# Patient Record
(demographics unavailable — no encounter records)

---

## 2025-03-03 NOTE — IMAGING
[Right] : right shoulder [Type 2 acromion] : Type 2 acromion [de-identified] :   ----------------------------------------------------------------------------   Thoracic/Lumbar spine exam:   Inspection:    (neg) Abnormal alignment (kyphosis/lordosis/scoliosis)   (neg) Atrophy ROM:    Pain:           (neg) Flexion/extension     (neg) Rotation    Stiffness:   (neg) Flexion/extension     (neg) Rotation                       (neg) Hamstring tightness Tenderness/Spasm:         +external oblique tenderness. no rib tenderness. no pain at CVA.    Lumbar paraspinal:          (+) Right    (neg) Left    (neg) Midline    Thoracic paraspinal:        (neg) Right    (neg) Left    (neg) Midline    PSIS:                                (neg) Right    (neg) Left    SI joint:                             (neg) Right    (neg) Left    Greater troch:                  (neg) Right    (neg) Left Strength: (out of 5)    Illiopsoas:           Right: 5   .    Left: 5    Quad:                 Right: 5   .    Left: 5    Hamstrings:        Right: 5   .    Left: 5    Anterior tibialis:  Right: 5    .   Left: 5    Gastrocsoleus:  Right: 5    .   Left: 5    EHL:                    Right: 5    .   Left: 5 Neuro: DTR's wnl.  Sensation to light touch grossly in tact in all distributions.    (neg) SLR    (neg) Femoral stretch Vascularity: Extremity warm and well perfused Gait: normal     ----------------------------------------------------------------------------   Right shoulder exam:   Skin: no significant pertinent finding Inspection: no obvious deformity, no obvious masses, no swelling, no effusion, no atrophy ROM:    FF: 180    ER: 70    IR: T12 Tenderness:    (neg) Anterior/Biceps:    (neg) Posterior    (+) Lateral    (neg) Trapezius    (neg) Scapula    (neg) AC joint    (neg) Crepitus with ROM Stability:    (neg) Translation    (neg) Apprehension    (neg) Clicking Additional tests:    (neg) Neer's    (neg) Hawkin's    (+) Husain's    (neg) Speed    (neg) Cross chest adduction Strength:    FF: 5/5    ER: 5/5    IR: 5/5    Biceps: 5/5    Triceps: 5/5    Distal: 5/5 Neuro: In tact to light touch throughout Vascularity: Extremity warm and well perfused

## 2025-03-03 NOTE — REASON FOR VISIT
[FreeTextEntry2] : Follow up right ribs states he is doing better attending PT 3 times a week and states his i having some sharp pain in rt shoulder area as well wants to get looked at.. Having  right shoulder pain with abduction for some months. Intermittent shooting pain

## 2025-03-03 NOTE — DISCUSSION/SUMMARY
[de-identified] : Discussed PT, Inj Physical Therapy rx Plan for right shoulder SAC / IAC f/u 6-8 weeks ----------------------------------------------------------------------------  Large joint corticosteroid injection given: Right shoulder subacromial  Patient indicated for injection after trial of rest, OTC medications including aspirin, Ibuprofen, Aleve etc or prescription NSAIDS, and/or exercises at home and/ or physical therapy without satisfactory response.  Patient has symptoms including pain, swelling, and/or decreased mobility in the joint. The risks, benefits, and alternatives to corticosteroid injection were explained in full to the patient, including but not limited to infection, sepsis, bleeding, scarring, skin discoloration, temporary increase in pain, syncopal episode, failure to resolve symptoms, allergic reaction, symptom recurrence, and elevation of blood sugar in diabetics. Patient understood the risks. All questions were answered. After discussion of options, patient requested an injection.   Oral informed consent was obtained and sterile technique was utilized for the procedure including the preparation of the solutions used for the injection and betadine followed by alcohol prep to the injection site. Anesthesia was given with ethyl chloride sprayed topically. The injection was delivered. Patient tolerated the procedure well.   Post Procedure Instructions: Patient was advised to call if redness, pain, or fever occur and apply ice for 15 min on and 15 min off later today  Medications delivered: Kenalog: 10 mg, Lidocaine: 4 cc, Marcaine: 4 cc.   ----------------------------------------------------------------------------   All relevant imaging studies pertinent to today's visit, including x-rays, MRI's and/or other advanced imaging studies (CT/etc) were independently interpreted and reviewed with the patient as needed. Implications of the studies together with the patient's clinical picture were discussed to formulate a working diagnosis and management options were detailed.   The patient and/or guardian was advised of the diagnosis.  The natural history of the pathology was explained in full. All questions were answered.  The risks and benefits of conservative and interventional treatment alternatives were explained to the patient   The patient and/or guardian was advised if any advanced diagnostic/imaging study (MRI/CT/etc) is ordered to evaluate potential pathology in the affected area(s), they should follow up in the office to review the results of the study and determine further management that may be indicated.

## 2025-03-03 NOTE — HISTORY OF PRESENT ILLNESS
[Sudden] : sudden [6] : 6 [3] : 3 [Frequent] : frequent [Rest] : rest [de-identified] : This is Mr. YUNG PONCE  a 44 year old male who comes in today complaining of right side pain after doing a hack squat at the gym on 1/14/25.  Pain in lateral oblique area   hx of GI ulcer [] : no [FreeTextEntry3] : 1/14/25 [FreeTextEntry7] : into back

## 2025-04-24 NOTE — HISTORY OF PRESENT ILLNESS
[Sudden] : sudden [5] : 5 [0] : 0 [Frequent] : frequent [Rest] : rest [de-identified] : This is Mr. YUNG PONCE  a 44 year old male who comes in today complaining of right side pain after doing a hack squat at the gym on 1/14/25.  Pain in lateral oblique area   hx of GI ulcer [] : no [FreeTextEntry3] : 1/14/25 [FreeTextEntry7] : into back [de-identified] : finished PT

## 2025-04-24 NOTE — REASON FOR VISIT
[FreeTextEntry2] : follow up right shoulder. notes no relief from the csi last visit. does feel PT was helpful. doing hep.

## 2025-04-24 NOTE — IMAGING
[Right] : right shoulder [Type 2 acromion] : Type 2 acromion [de-identified] :   ----------------------------------------------------------------------------   Right shoulder exam:   Skin: no significant pertinent finding Inspection: no obvious deformity, no obvious masses, no swelling, no effusion, no atrophy ROM:    FF: 180    ER: 70    IR: T12 Tenderness:    (neg) Anterior/Biceps:    (neg) Posterior    (+) Lateral    (neg) Trapezius    (neg) Scapula    (neg) AC joint    (neg) Crepitus with ROM Stability:    (neg) Translation    (neg) Apprehension    (neg) Clicking Additional tests:    (+) Neer's    (neg) Hawkin's    (+) Husain's    (neg) Speed    (neg) Cross chest adduction Strength:    FF: 5/5    ER: 5/5 + pain     IR: 5/5    Biceps: 5/5    Triceps: 5/5    Distal: 5/5 Neuro: In tact to light touch throughout Vascularity: Extremity warm and well perfused

## 2025-04-24 NOTE — DISCUSSION/SUMMARY
[de-identified] : MRI R shoulder - eval rtc  continue HEP f/u p mri   ----------------------------------------------------------------------------   All relevant imaging studies pertinent to today's visit, including x-rays, MRI's and/or other advanced imaging studies (CT/etc) were independently interpreted and reviewed with the patient as needed. Implications of the studies together with the patient's clinical picture were discussed to formulate a working diagnosis and management options were detailed.   The patient and/or guardian was advised of the diagnosis.  The natural history of the pathology was explained in full. All questions were answered.  The risks and benefits of conservative and interventional treatment alternatives were explained to the patient   The patient and/or guardian was advised if any advanced diagnostic/imaging study (MRI/CT/etc) is ordered to evaluate potential pathology in the affected area(s), they should follow up in the office to review the results of the study and determine further management that may be indicated.

## 2025-05-12 NOTE — DATA REVIEWED
[MRI] : MRI [Right] : of the right [Shoulder] : shoulder [FreeTextEntry1] : significant AC arthrosis, shoulder bursitis, mild- mod supraspinatus tendinopathy, mild biceps tendinitis

## 2025-05-12 NOTE — REASON FOR VISIT
[FreeTextEntry2] : mri review right shoulder. No significant change in sx. c/o shoulder blade pain and pain into upper arm.

## 2025-05-12 NOTE — IMAGING
[Right] : right shoulder [Type 2 acromion] : Type 2 acromion [de-identified] :   ----------------------------------------------------------------------------   Right shoulder exam:   Skin: no significant pertinent finding Inspection: no obvious deformity, no obvious masses, no swelling, no effusion, no atrophy ROM:    FF: 180    ER: 70    IR: T12 Tenderness:    (neg) Anterior/Biceps:    (neg) Posterior    (+) Lateral    (neg) Trapezius    (neg) Scapula    (neg) AC joint    (neg) Crepitus with ROM Stability:    (neg) Translation    (neg) Apprehension    (neg) Clicking Additional tests:    (+) Neer's    (neg) Hawkin's    (+) Husain's    (neg) Speed    (neg) Cross chest adduction Strength:    FF: 5/5    ER: 5/5 + pain     IR: 5/5    Biceps: 5/5    Triceps: 5/5    Distal: 5/5 Neuro: In tact to light touch throughout Vascularity: Extremity warm and well perfused

## 2025-05-12 NOTE — DISCUSSION/SUMMARY
[de-identified] : MRI reviewed (Consulted Dr Welch requiring imaging) Continue PT/ HEP mdp rx f/u 6 wks   ----------------------------------------------------------------------------   All relevant imaging studies pertinent to today's visit, including x-rays, MRI's and/or other advanced imaging studies (CT/etc) were independently interpreted and reviewed with the patient as needed. Implications of the studies together with the patient's clinical picture were discussed to formulate a working diagnosis and management options were detailed.   The patient and/or guardian was advised of the diagnosis.  The natural history of the pathology was explained in full. All questions were answered.  The risks and benefits of conservative and interventional treatment alternatives were explained to the patient   The patient and/or guardian was advised if any advanced diagnostic/imaging study (MRI/CT/etc) is ordered to evaluate potential pathology in the affected area(s), they should follow up in the office to review the results of the study and determine further management that may be indicated.

## 2025-05-12 NOTE — HISTORY OF PRESENT ILLNESS
[Sudden] : sudden [3] : 3 [0] : 0 [Frequent] : frequent [Rest] : rest [de-identified] : This is Mr. YUNG PONCE  a 44 year old male who comes in today complaining of right side pain after doing a hack squat at the gym on 1/14/25.  Pain in lateral oblique area   hx of GI ulcer - no nsaids  [] : no [FreeTextEntry3] : 1/14/25 [FreeTextEntry7] : into back [de-identified] : HEP/MRI

## 2025-06-20 NOTE — IMAGING
[Right] : right shoulder [Type 2 acromion] : Type 2 acromion [Facet arthropathy] : Facet arthropathy [de-identified] :   ----------------------------------------------------------------------------   Right shoulder exam:   Skin: no significant pertinent finding Inspection: no obvious deformity, no obvious masses, no swelling, no effusion, no atrophy  (+) Pt elevating scapula with shoulder abduction  ROM:    FF: 180    ER: 70    IR: T12 Tenderness:    (neg) Anterior/Biceps:    (neg) Posterior    (+) Lateral    (neg) Trapezius    (neg) Scapula    (neg) AC joint    (neg) Crepitus with ROM Stability:    (neg) Translation    (neg) Apprehension    (neg) Clicking Additional tests:    (+) Neer's    (neg) Hawkin's    (+) Husain's    (neg) Speed    (neg) Cross chest adduction Strength:    FF: 5/5    ER: 5/5 + pain     IR: 5/5    Biceps: 5/5    Triceps: 5/5    Distal: 5/5 Neuro: In tact to light touch throughout Vascularity: Extremity warm and well perfused    [FreeTextEntry1] : C3-C4 DDD, C5-C6 DDD, straightening

## 2025-06-20 NOTE — DISCUSSION/SUMMARY
[Medication Risks Reviewed] : Medication risks reviewed [de-identified] :  Pt had no improvement from prev SAC/IAC CSI inj as pain distributed from scapula, deltoid, and elbow and etiology could be shoulder/cervical or PMR given he had good response to MDP Plan for MRI of the C-spine  Rx: Cyclobenzaprine HCL  5mgs f/u after MRI ----------------------------------------------------------------------------   All relevant imaging studies pertinent to today's visit, including x-rays, MRI's and/or other advanced imaging studies (CT/etc) were independently interpreted and reviewed with the patient as needed. Implications of the studies together with the patient's clinical picture were discussed to formulate a working diagnosis and management options were detailed.   The patient and/or guardian was advised of the diagnosis.  The natural history of the pathology was explained in full. All questions were answered.  The risks and benefits of conservative and interventional treatment alternatives were explained to the patient   The patient and/or guardian was advised if any advanced diagnostic/imaging study (MRI/CT/etc) is ordered to evaluate potential pathology in the affected area(s), they should follow up in the office to review the results of the study and determine further management that may be indicated.

## 2025-06-20 NOTE — REASON FOR VISIT
[FreeTextEntry2] : follow up- right shoulder. took MDP and had good relief but the pain returned. Pt has kept up with HEP.  Pt did not experience good relief from prev shoulder CSI inj but did get good relief with MDP but was only temporary. Notes pain is mostly on the border of the scaps. Notes he has pain in the neck, scaps, B/L shoulders and B/L elbows.

## 2025-06-20 NOTE — HISTORY OF PRESENT ILLNESS
[Sudden] : sudden [5] : 5 [0] : 0 [Frequent] : frequent [Rest] : rest [de-identified] : This is Mr. YUNG PONCE  a 44 year old male who comes in today complaining of right side pain after doing a hack squat at the gym on 1/14/25.  Pain in lateral oblique area   hx of GI ulcer - no nsaids  [] : no [FreeTextEntry3] : 1/14/25 [FreeTextEntry7] : into back

## 2025-07-18 NOTE — REASON FOR VISIT
[FreeTextEntry2] : Follow up- right shoulder. took MDP and had good relief but the pain returned. Pt finished PT,  kept up with HEP.  Pt did not experience good relief from prev shoulder CSI inj but did get good relief with MDP but was only temporary. Notes pain is mostly on the border of the scaps. Notes he has pain in the neck, scaps, B/L shoulders and B/L elbows.

## 2025-07-18 NOTE — IMAGING
[Facet arthropathy] : Facet arthropathy [Right] : right shoulder [Type 2 acromion] : Type 2 acromion [de-identified] :   ----------------------------------------------------------------------------   Right shoulder exam:   Skin: no significant pertinent finding Inspection: no obvious deformity, no obvious masses, no swelling, no effusion, no atrophy  (+) Pt elevating scapula with shoulder abduction  ROM:    FF: 180    ER: 70    IR: T12 Tenderness:    (neg) Anterior/Biceps:    (neg) Posterior    (+) Lateral    (neg) Trapezius    (neg) Scapula    (neg) AC joint    (neg) Crepitus with ROM Stability:    (neg) Translation    (neg) Apprehension    (neg) Clicking Additional tests:    (+) Neer's    (neg) Hawkin's    (+) Husain's    (neg) Speed    (neg) Cross chest adduction Strength:    FF: 5/5    ER: 5/5 + pain     IR: 5/5    Biceps: 5/5    Triceps: 5/5    Distal: 5/5 Neuro: In tact to light touch throughout Vascularity: Extremity warm and well perfused    [FreeTextEntry1] : C3-C4 DDD, C5-C6 DDD, straightening

## 2025-07-18 NOTE — HISTORY OF PRESENT ILLNESS
[Sudden] : sudden [5] : 5 [0] : 0 [Frequent] : frequent [Rest] : rest [de-identified] : This is Mr. YUNG PONCE  a 44 year old male who comes in today complaining of right side pain after doing a hack squat at the gym on 1/14/25.  Pain in lateral oblique area, he was requested for an MRI R shoulder, but it was not authorized   hx of GI ulcer - no nsaids  [] : no [FreeTextEntry3] : 1/14/25 [FreeTextEntry7] : into back

## 2025-07-18 NOTE — DISCUSSION/SUMMARY
[Medication Risks Reviewed] : Medication risks reviewed [de-identified] : Pt has failed conservative treatment with CSI, prescription NSAIDs, rx cyclobenzaprine, extensive PT (15 visits over course of several months). Pt continues to have consistent shoulder/neck pain. He is indicated for MRI c-spine to rule out HNP Consult with rheumatologist/PM&R Plan for RT scapula TPI Renew Cyclobenzaprine rx f/u 2-3 months   ----------------------------------------------------------------------------   Trigger Point Injection given:   Patient indicated for injection after trial of rest, OTC medications including aspirin, Ibuprofen, Aleve etc or prescription NSAIDS, and/or exercises at home and/ or physical therapy without satisfactory response.  Patient has symptoms including pain, swelling, and/or decreased mobility in the joint. The risks, benefits, and alternatives to corticosteroid injection were explained in full to the patient, including but not limited to infection, sepsis, bleeding, scarring, skin discoloration, temporary increase in pain, syncopal episode, failure to resolve symptoms, allergic reaction, symptom recurrence, and elevation of blood sugar in diabetics. Patient understood the risks. All questions were answered. After discussion of options, patient requested an injection.   Oral informed consent was obtained and sterile technique was utilized for the procedure including the preparation of the solutions used for the injection and betadine followed by alcohol prep to the injection site. Anesthesia was given with ethyl chloride sprayed topically. The injection was delivered. Patient tolerated the procedure well.   Post Procedure Instructions: Patient was advised to call if redness, pain, or fever occur and apply ice for 15 min on and 15 min off later today   Medications delivered: Kenalog: 10mg, Lidocaine: 2cc  ----------------------------------------------------------------------------   All relevant imaging studies pertinent to today's visit, including x-rays, MRI's and/or other advanced imaging studies (CT/etc) were independently interpreted and reviewed with the patient as needed. Implications of the studies together with the patient's clinical picture were discussed to formulate a working diagnosis and management options were detailed.   The patient and/or guardian was advised of the diagnosis.  The natural history of the pathology was explained in full. All questions were answered.  The risks and benefits of conservative and interventional treatment alternatives were explained to the patient   The patient and/or guardian was advised if any advanced diagnostic/imaging study (MRI/CT/etc) is ordered to evaluate potential pathology in the affected area(s), they should follow up in the office to review the results of the study and determine further management that may be indicated.